# Patient Record
Sex: MALE | Race: WHITE
[De-identification: names, ages, dates, MRNs, and addresses within clinical notes are randomized per-mention and may not be internally consistent; named-entity substitution may affect disease eponyms.]

---

## 2021-10-17 ENCOUNTER — HOSPITAL ENCOUNTER (EMERGENCY)
Dept: HOSPITAL 77 - KA.ED | Age: 31
Discharge: HOME | End: 2021-10-17
Payer: COMMERCIAL

## 2021-10-17 VITALS — DIASTOLIC BLOOD PRESSURE: 89 MMHG | SYSTOLIC BLOOD PRESSURE: 137 MMHG | HEART RATE: 69 BPM

## 2021-10-17 DIAGNOSIS — N13.2: Primary | ICD-10-CM

## 2021-10-17 LAB
ANION GAP SERPL CALC-SCNC: 16.3 MMOL/L (ref 5–15)
CHLORIDE SERPL-SCNC: 106 MMOL/L (ref 98–107)
SODIUM SERPL-SCNC: 144 MMOL/L (ref 136–145)

## 2021-10-17 PROCEDURE — 85025 COMPLETE CBC W/AUTO DIFF WBC: CPT

## 2021-10-17 PROCEDURE — 81001 URINALYSIS AUTO W/SCOPE: CPT

## 2021-10-17 PROCEDURE — 99284 EMERGENCY DEPT VISIT MOD MDM: CPT

## 2021-10-17 PROCEDURE — 74176 CT ABD & PELVIS W/O CONTRAST: CPT

## 2021-10-17 PROCEDURE — 80048 BASIC METABOLIC PNL TOTAL CA: CPT

## 2021-10-17 PROCEDURE — 96374 THER/PROPH/DIAG INJ IV PUSH: CPT

## 2021-10-17 PROCEDURE — 36415 COLL VENOUS BLD VENIPUNCTURE: CPT

## 2021-10-17 NOTE — CT
______________________________________________________________________________   

  

[id number] CT/CT Abdomen Pelvis WO IV  

EXAM: CT Abdomen Pelvis WO IV  

   

 CLINICAL DATA: ABDOMINAL PAIN  

   

 COMPARISON STUDY: None.  

   

 FINDINGS:  

   

 Lung bases are clear.   

   

 Liver, spleen, gallbladder, pancreas, adrenal glands, and right kidney. 3 mm  

 stone at the left vesicoureteral junction resulting in mild left  

 hydroureteronephrosis.  

   

 No bowel obstruction or inflammation. The appendix is visualized and appears  

 normal.  

   

 No lymphadenopathy, free fluid, or pneumoperitoneum.   

   

 No fracture or osseous lesion.  

   

 IMPRESSION:  

   

 3 mm stone at the left vesicoureteral junction resulting in mild left  

 hydroureteronephrosis.  

   

 Electronically signed by Curtis Lam MD on 10/17/2021 1:07 PM  

   

  

Curtis Lam DO                 

 10/17/21 [id number]    

  

Thank you for allowing us to participate in the care of your patient.

## 2021-10-17 NOTE — EDM.PDOC
ED HPI GENERAL MEDICAL PROBLEM





- General


Chief Complaint: Abdominal Pain


Stated Complaint: LEFT SIDE FLANK PAIN


Time Seen by Provider: 10/17/21 12:01


Source of Information: Reports: Patient


History Limitations: Reports: No Limitations





- History of Present Illness


INITIAL COMMENTS - FREE TEXT/NARRATIVE: 





29 YO WM PRESENTS WITH SUDDEN ONSET LEFT FLANK PAIN WITH RADIATION TO LEFT LOWER

QUADRANT OF ABDOMEN WHICH BEGAN JUST PRIOR TO ARRIVAL. PT REPORTS HE WAS WALKING

WHEN HE FELT THIS PAIN AND IT BEGAN TO INTENSIFY WITH ASSOCIATED NAUSEA, 

DIAPHORESIS, AND DIZZINESS. PT DENIES SYNCOPE. NO FEVER/CHILLS, NO DYSURIA OR 

URINARY FREQUENCY. PT THINKS HE MAY HAVE A KIDNEY STONE AS MANY MEMBERS OF HIS 

FAMILY HAVE HAD IN THE PAST. PT DENIES HISTORY OF KIDNEY STONES. 


Onset: Sudden


Duration: Getting Worse


Location: Reports: Abdomen, Back


Quality: Reports: Sharp


Severity: Severe


Improves with: Reports: None


Worsens with: Reports: None


Associated Symptoms: Reports: No Other Symptoms, Nausea/Vomiting


  ** Left Abdomen


Pain Score (Numeric/FACES): 10





- Related Data


                                    Allergies











Allergy/AdvReac Type Severity Reaction Status Date / Time


 


No Known Drug Allergies Allergy  Other Verified 10/17/21 12:35











Home Meds: 


                                    Home Meds





. [No Known Home Meds]  10/14/15 [History]











Past Medical History





- Past Health History


Medical/Surgical History: Denies Medical/Surgical History





Social & Family History





- Tobacco Use


Tobacco Use Status *Q: Never Tobacco User





- Caffeine Use


Caffeine Use: Reports: Soda





- Recreational Drug Use


Recreational Drug Use: No





ED ROS GENERAL





- Review of Systems


Review Of Systems: See Below


Constitutional: Reports: No Symptoms


HEENT: Reports: No Symptoms


Respiratory: Reports: No Symptoms


Cardiovascular: Reports: No Symptoms


Endocrine: Reports: No Symptoms


GI/Abdominal: Reports: Abdominal Pain


: Reports: Flank Pain


Musculoskeletal: Reports: No Symptoms


Skin: Reports: No Symptoms


Neurological: Reports: No Symptoms


Psychiatric: Reports: No Symptoms


Hematologic/Lymphatic: Reports: No Symptoms


Immunologic: Reports: No Symptoms





ED EXAM, RENAL/





- Physical Exam


Exam: See Below


Exam Limited By: No Limitations


General Appearance: Alert, WD/WN, Moderate Distress


Head: Atraumatic, Normocephalic


Neck: Normal Inspection, Supple, Non-Tender, Full Range of Motion


Respiratory/Chest: No Respiratory Distress, Lungs Clear, Normal Breath Sounds, 

No Accessory Muscle Use, Chest Non-Tender


Cardiovascular: Normal Peripheral Pulses, Regular Rate, Rhythm, No Edema, No 

Gallop, No JVD, No Murmur, No Rub


GI/Abdominal: Normal Bowel Sounds, Soft, Non-Tender, No Organomegaly, No 

Distention, No Abnormal Bruit, No Mass


Back Exam: CVA Tenderness (L)


Extremities: Normal Inspection, Normal Range of Motion, Non-Tender, Normal 

Capillary Refill, No Pedal Edema


Neurological: Alert, Oriented, CN II-XII Intact, Normal Cognition, Normal Gait, 

No Motor/Sensory Deficits


Psychiatric: Normal Affect, Normal Mood


Skin Exam: Warm, Dry, Intact, Normal Color, No Rash


Lymphatic: No Adenopathy





Course





- Vital Signs


Last Recorded V/S: 


                                Last Vital Signs











Temp  97.2 F   10/17/21 12:32


 


Pulse  69   10/17/21 12:32


 


Resp  20   10/17/21 12:32


 


BP  137/89   10/17/21 12:32


 


Pulse Ox  98   10/17/21 12:32














- Orders/Labs/Meds


Orders: 


                               Active Orders 24 hr











 Category Date Time Status


 


 UA W/COLEEN RFLX IF INDICATED [URIN] Stat Lab  10/17/21 11:51 Ordered











Labs: 


                                Laboratory Tests











  10/17/21 10/17/21 10/17/21 Range/Units





  12:05 12:05 13:20 


 


WBC  10.32 H    (5.00-10.00)  10^3/uL


 


RBC  4.87    (4.50-6.00)  10^6/uL


 


Hgb  14.3    (13.0-17.0)  g/dL


 


Hct  42.8    (40.0-52.0)  %


 


MCV  87.9    (82.0-92.0)  fL


 


MCH  29.4    (27.0-31.0)  pg


 


MCHC  33.4    (32.0-36.0)  g/dL


 


RDW  12.5    (11.5-14.5)  %


 


Plt Count  242    (150-400)  10^3/uL


 


MPV  9.2    (7.4-10.4)  fL


 


Immature Gran % (Auto)  0.2    (0.0-5.0)  %


 


Neut % (Auto)  78.8 H    (50.0-70.0)  %


 


Lymph % (Auto)  12.2 L    (20.0-40.0)  %


 


Mono % (Auto)  6.5    (2.0-8.0)  %


 


Eos % (Auto)  1.9    (1.0-3.0)  %


 


Baso % (Auto)  0.4    (0.0-1.0)  %


 


Neut # (Auto)  8.13 H    (2.50-7.00)  10^3/uL


 


Lymph # (Auto)  1.26    (1.00-4.00)  10^3/uL


 


Mono # (Auto)  0.67    (0.10-0.80)  10^3/uL


 


Eos # (Auto)  0.20    (0.10-0.30)  10^3/uL


 


Baso # (Auto)  0.04    (0.00-0.10)  10^3/uL


 


Immature Gran # (Auto)  0.02    (0.00-0.50)  10^3/uL


 


Sodium   144   (136-145)  mmol/L


 


Potassium   4.3   (3.5-5.1)  mmol/L


 


Chloride   106   ()  mmol/L


 


Carbon Dioxide   26.0   (21.0-32.0)  mmol/L


 


Anion Gap   16.3 H   (5-15)  mmol/L


 


BUN   17   (7-18)  mg/dL


 


Creatinine   1.05   (0.51-1.17)  mg/dL


 


Est Cr Clr Drug Dosing   116.26   mL/min


 


Estimated GFR (MDRD)   > 60   mL/min


 


Glucose   151 H   ()  mg/dL


 


Calcium   9.0   (8.7-10.3)  mg/dL


 


Urine Color    Yellow  (YELLOW)  


 


Urine Appearance    Clear  (CLEAR)  


 


Urine pH    6.0  (5.0-9.0)  


 


Ur Specific Gravity    >= 1.030  (1.005-1.030)  


 


Urine Protein    Negative  (NEGATIVE)  mg/dL


 


Urine Glucose (UA)    Negative  (NEGATIVE)  mg/dL


 


Urine Ketones    40 H  (NEGATIVE)  mg/dL


 


Urine Occult Blood    Moderate H  (NEGATIVE)  


 


Urine Nitrite    Negative  (NEGATIVE)  


 


Urine Bilirubin    Negative  (NEGATIVE)  


 


Urine Urobilinogen    0.2  (0.2-1.0)  E.U./dL


 


Ur Leukocyte Esterase    Negative  (NEGATIVE)  











Meds: 


Medications














Discontinued Medications














Generic Name Dose Route Start Last Admin





  Trade Name Freq  PRN Reason Stop Dose Admin


 


Sodium Chloride  1,000 mls @ 999 mls/hr  10/17/21 11:50 





  Normal Saline  IV  10/17/21 12:50 





  .BOLUS ONE  


 


Ketorolac Tromethamine  30 mg  10/17/21 11:49 





  Ketorolac 30 Mg/Ml Sdv  IVPUSH  10/17/21 11:50 





  ONETIME ONE  


 


Tramadol HCl  400 mg  10/17/21 13:23 





  Tramadol 50 Mg Tab  PO  10/17/21 13:24 





  ONETIME ONE  














- Radiology Interpretation


Free Text/Narrative:: 





CT ABD/PELVIS- 3MM STONE IN UVJ MILD HYDRONEPHROSIS





- Re-Assessments/Exams


Free Text/Narrative Re-Assessment/Exam: 





10/17/21 13:19


PAIN RESOLVED AFTER IV TORADOL. PT APPEARS COMFORTABLE AND IN NAD. 





Departure





- Departure


Time of Disposition: 13:25


Disposition: Home, Self-Care 01


Condition: Good


Clinical Impression: 


 Kidney stone on left side








- Discharge Information


Instructions:  Kidney Stones


Referrals: 


Omari Esparza MD [Primary Care Provider] - 


Forms:  ED Department Discharge


Additional Instructions: 


1. DISCHARGE HOME


2. ULTRAM 50-100MG EVERY 4-6 HOURS AS NEEDED FOR PAIN


3. DRINK PLENTY OF FLUIDS


4. MOTRIN 600MG EVERY 6 HOURS AS NEEDED FOR PAIN


5. RETURN TO ER FOR WORSENING SYMPTOMS








Sepsis Event Note (ED)





- Evaluation


Sepsis Screening Result: No Definite Risk





- Focused Exam


Vital Signs: 


                                   Vital Signs











  Temp Pulse Resp BP Pulse Ox


 


 10/17/21 12:32  97.2 F  69  20  137/89  98














- My Orders


Last 24 Hours: 


My Active Orders





10/17/21 11:51


UA W/COLEEN RFLX IF INDICATED [URIN] Stat 














- Assessment/Plan


Last 24 Hours: 


My Active Orders





10/17/21 11:51


UA W/COLEEN RFLX IF INDICATED [URIN] Stat 











Assessment:: 





1. RENAL CALCULI WITH HYDRONEPHROSIS


Plan: 





1. DISCHARGE HOME


2. ULTRAM 50-100MG EVERY 4-6 HOURS AS NEEDED FOR PAIN


3. DRINK PLENTY OF FLUIDS


4. MOTRIN 600MG EVERY 6 HOURS AS NEEDED FOR PAIN


5. RETURN TO ER FOR WORSENING SYMPTOMS